# Patient Record
Sex: FEMALE | Race: WHITE | NOT HISPANIC OR LATINO | Employment: OTHER | URBAN - METROPOLITAN AREA
[De-identification: names, ages, dates, MRNs, and addresses within clinical notes are randomized per-mention and may not be internally consistent; named-entity substitution may affect disease eponyms.]

---

## 2018-03-19 ENCOUNTER — ESTABLISHED COMPREHENSIVE EXAM (OUTPATIENT)
Dept: URBAN - METROPOLITAN AREA CLINIC 40 | Facility: CLINIC | Age: 63
End: 2018-03-19

## 2018-03-19 DIAGNOSIS — Z83.518: ICD-10-CM

## 2018-03-19 DIAGNOSIS — H52.4: ICD-10-CM

## 2018-03-19 DIAGNOSIS — H52.03: ICD-10-CM

## 2018-03-19 DIAGNOSIS — Z79.899: ICD-10-CM

## 2018-03-19 DIAGNOSIS — H52.203: ICD-10-CM

## 2018-03-19 PROCEDURE — 92014 COMPRE OPH EXAM EST PT 1/>: CPT

## 2018-03-19 PROCEDURE — 92015 DETERMINE REFRACTIVE STATE: CPT

## 2018-03-19 PROCEDURE — 92083 EXTENDED VISUAL FIELD XM: CPT

## 2018-03-19 PROCEDURE — 92499OP2 OPTOMAP RETINAL SCREENING BOTH EYES

## 2018-03-19 ASSESSMENT — VISUAL ACUITY
OD_CC: J1
OD_SC: 20/25-1
OU_SC: 20/20
OS_CC: J1
OS_SC: 20/25-1
OU_CC: J1

## 2018-03-19 ASSESSMENT — KERATOMETRY
OS_K1POWER_DIOPTERS: 46.25
OD_AXISANGLE2_DEGREES: 078
OS_AXISANGLE2_DEGREES: 056
OD_K1POWER_DIOPTERS: 46.25
OD_AXISANGLE_DEGREES: 168
OS_K2POWER_DIOPTERS: 46.00
OS_AXISANGLE_DEGREES: 146
OD_K2POWER_DIOPTERS: 45.25

## 2018-03-19 ASSESSMENT — TONOMETRY
OS_IOP_MMHG: 15
OD_IOP_MMHG: 16

## 2018-06-14 NOTE — PATIENT DISCUSSION
This visual field clearly demonstrated a minimum of % loss of upper field of vision , with upper lid skin in repose and elevated by taping of the lid to demonstrate potential correction. This field shows that taping the lids significantly improved this patient's superior field of vision by approximately %, .

## 2018-06-14 NOTE — PATIENT DISCUSSION
Recommended excision/biopsy. Will schedule orbitotomy with lesion removal in [facility] [place].  Recommended doing LEFT eye first.

## 2018-08-02 NOTE — PATIENT DISCUSSION
Recommended excision/biopsy. Will schedule orbitotomy with lesion removal in [facility].  Recommended doing LEFT eye first.

## 2018-08-02 NOTE — PATIENT DISCUSSION
return in 1 month for ectopian repair LLL, do this sx same time OD Lesion removal ex bx (lipoma OD removal).

## 2020-06-22 ENCOUNTER — EST. PATIENT EMERGENCY (OUTPATIENT)
Dept: URBAN - METROPOLITAN AREA CLINIC 40 | Facility: CLINIC | Age: 65
End: 2020-06-22

## 2020-06-22 DIAGNOSIS — G43.B0: ICD-10-CM

## 2020-06-22 DIAGNOSIS — Z79.899: ICD-10-CM

## 2020-06-22 DIAGNOSIS — H25.812: ICD-10-CM

## 2020-06-22 DIAGNOSIS — H25.811: ICD-10-CM

## 2020-06-22 PROCEDURE — 92014 COMPRE OPH EXAM EST PT 1/>: CPT

## 2020-06-22 ASSESSMENT — KERATOMETRY
OD_K2POWER_DIOPTERS: 45.25
OD_K1POWER_DIOPTERS: 46.25
OS_AXISANGLE_DEGREES: 146
OD_AXISANGLE2_DEGREES: 078
OS_K1POWER_DIOPTERS: 46.25
OD_AXISANGLE_DEGREES: 168
OS_K2POWER_DIOPTERS: 46.00
OS_AXISANGLE2_DEGREES: 056

## 2020-06-22 ASSESSMENT — VISUAL ACUITY
OS_SC: 20/30-2
OD_SC: 20/25-1

## 2020-06-22 ASSESSMENT — TONOMETRY
OS_IOP_MMHG: 16
OD_IOP_MMHG: 17

## 2021-05-13 ENCOUNTER — ESTABLISHED COMPREHENSIVE EXAM (OUTPATIENT)
Dept: URBAN - METROPOLITAN AREA CLINIC 40 | Facility: CLINIC | Age: 66
End: 2021-05-13

## 2021-05-13 DIAGNOSIS — H25.813: ICD-10-CM

## 2021-05-13 DIAGNOSIS — H52.203: ICD-10-CM

## 2021-05-13 DIAGNOSIS — Z79.899: ICD-10-CM

## 2021-05-13 DIAGNOSIS — H52.4: ICD-10-CM

## 2021-05-13 DIAGNOSIS — H52.03: ICD-10-CM

## 2021-05-13 DIAGNOSIS — G43.B0: ICD-10-CM

## 2021-05-13 PROCEDURE — 92015 DETERMINE REFRACTIVE STATE: CPT

## 2021-05-13 PROCEDURE — 92012 INTRM OPH EXAM EST PATIENT: CPT

## 2021-05-13 PROCEDURE — 92134 CPTRZ OPH DX IMG PST SGM RTA: CPT

## 2021-05-13 RX ORDER — PREDNISOLONE ACETATE 10 MG/ML: 1 SUSPENSION/ DROPS OPHTHALMIC

## 2021-05-13 ASSESSMENT — KERATOMETRY
OD_K1POWER_DIOPTERS: 46.25
OS_K2POWER_DIOPTERS: 46.00
OD_AXISANGLE2_DEGREES: 078
OS_K1POWER_DIOPTERS: 46.25
OS_AXISANGLE_DEGREES: 146
OD_AXISANGLE_DEGREES: 168
OD_K2POWER_DIOPTERS: 45.25
OS_AXISANGLE2_DEGREES: 056

## 2021-05-13 ASSESSMENT — VISUAL ACUITY
OD_CC: J1
OS_SC: 20/20-1
OS_SC: J8
OS_CC: J1
OD_SC: J8
OD_SC: 20/20-2

## 2021-05-13 ASSESSMENT — TONOMETRY
OS_IOP_MMHG: 18
OD_IOP_MMHG: 19

## 2021-06-09 ENCOUNTER — ESTABLISHED PATIENT (OUTPATIENT)
Dept: URBAN - METROPOLITAN AREA CLINIC 40 | Facility: CLINIC | Age: 66
End: 2021-06-09

## 2021-06-09 DIAGNOSIS — H15.101: ICD-10-CM

## 2021-06-09 PROCEDURE — 92012 INTRM OPH EXAM EST PATIENT: CPT

## 2021-06-09 ASSESSMENT — KERATOMETRY
OS_K2POWER_DIOPTERS: 46.00
OD_K1POWER_DIOPTERS: 46.25
OS_AXISANGLE_DEGREES: 146
OD_K2POWER_DIOPTERS: 45.25
OD_AXISANGLE2_DEGREES: 078
OS_AXISANGLE2_DEGREES: 056
OS_K1POWER_DIOPTERS: 46.25
OD_AXISANGLE_DEGREES: 168

## 2021-09-01 ENCOUNTER — CONSULT (OUTPATIENT)
Dept: URBAN - METROPOLITAN AREA CLINIC 39 | Facility: CLINIC | Age: 66
End: 2021-09-01

## 2021-09-01 DIAGNOSIS — H02.835: ICD-10-CM

## 2021-09-01 DIAGNOSIS — H02.832: ICD-10-CM

## 2021-09-01 DIAGNOSIS — Z79.899: ICD-10-CM

## 2021-09-01 DIAGNOSIS — H02.834: ICD-10-CM

## 2021-09-01 DIAGNOSIS — H02.831: ICD-10-CM

## 2021-09-01 PROCEDURE — 99213 OFFICE O/P EST LOW 20 MIN: CPT

## 2021-09-01 PROCEDURE — 92285 EXTERNAL OCULAR PHOTOGRAPHY: CPT

## 2021-09-01 ASSESSMENT — KERATOMETRY
OS_K1POWER_DIOPTERS: 46.25
OD_AXISANGLE2_DEGREES: 078
OD_K1POWER_DIOPTERS: 46.25
OD_K2POWER_DIOPTERS: 45.25
OS_AXISANGLE2_DEGREES: 056
OD_AXISANGLE_DEGREES: 168
OS_K2POWER_DIOPTERS: 46.00
OS_AXISANGLE_DEGREES: 146

## 2021-09-01 ASSESSMENT — VISUAL ACUITY
OS_CC: 20/25+1
OD_CC: 20/25

## 2021-09-22 ENCOUNTER — TECH ONLY (OUTPATIENT)
Dept: URBAN - METROPOLITAN AREA CLINIC 39 | Facility: CLINIC | Age: 66
End: 2021-09-22

## 2021-09-22 DIAGNOSIS — Z79.899: ICD-10-CM

## 2021-09-22 DIAGNOSIS — H02.835: ICD-10-CM

## 2021-09-22 DIAGNOSIS — H02.831: ICD-10-CM

## 2021-09-22 DIAGNOSIS — H02.834: ICD-10-CM

## 2021-09-22 DIAGNOSIS — G43.B0: ICD-10-CM

## 2021-09-22 DIAGNOSIS — H02.832: ICD-10-CM

## 2021-09-22 PROCEDURE — 92082 INTERMEDIATE VISUAL FIELD XM: CPT

## 2021-09-22 PROCEDURE — 99211T TECH SERVICE

## 2021-09-22 ASSESSMENT — KERATOMETRY
OD_AXISANGLE2_DEGREES: 078
OD_AXISANGLE_DEGREES: 168
OS_AXISANGLE2_DEGREES: 056
OD_K1POWER_DIOPTERS: 46.25
OS_AXISANGLE_DEGREES: 146
OD_K2POWER_DIOPTERS: 45.25
OS_K2POWER_DIOPTERS: 46.00
OS_K1POWER_DIOPTERS: 46.25

## 2021-10-08 ENCOUNTER — EST. PATIENT EMERGENCY (OUTPATIENT)
Dept: URBAN - METROPOLITAN AREA CLINIC 40 | Facility: CLINIC | Age: 66
End: 2021-10-08

## 2021-10-08 DIAGNOSIS — H57.12: ICD-10-CM

## 2021-10-08 DIAGNOSIS — G43.B0: ICD-10-CM

## 2021-10-08 PROCEDURE — 92012 INTRM OPH EXAM EST PATIENT: CPT

## 2021-10-08 ASSESSMENT — VISUAL ACUITY
OD_CC: 20/20
OU_CC: 20/20
OD_CC: J1+
OS_CC: 20/20
OU_CC: J1+
OS_CC: J1+

## 2021-10-08 ASSESSMENT — KERATOMETRY
OD_K2POWER_DIOPTERS: 45.25
OD_K1POWER_DIOPTERS: 46.25
OD_AXISANGLE2_DEGREES: 078
OS_K2POWER_DIOPTERS: 46.00
OS_K1POWER_DIOPTERS: 46.25
OD_AXISANGLE_DEGREES: 168
OS_AXISANGLE2_DEGREES: 056
OS_AXISANGLE_DEGREES: 146

## 2021-10-08 ASSESSMENT — TONOMETRY
OD_IOP_MMHG: 15
OS_IOP_MMHG: 15

## 2021-11-10 ENCOUNTER — PRE-OP/H&P (OUTPATIENT)
Dept: URBAN - METROPOLITAN AREA CLINIC 39 | Facility: CLINIC | Age: 66
End: 2021-11-10

## 2021-11-10 DIAGNOSIS — G43.B0: ICD-10-CM

## 2021-11-10 DIAGNOSIS — Z79.899: ICD-10-CM

## 2021-11-10 DIAGNOSIS — H02.831: ICD-10-CM

## 2021-11-10 DIAGNOSIS — H02.834: ICD-10-CM

## 2021-11-10 DIAGNOSIS — H15.101: ICD-10-CM

## 2021-11-10 DIAGNOSIS — H25.813: ICD-10-CM

## 2021-11-10 DIAGNOSIS — H02.835: ICD-10-CM

## 2021-11-10 DIAGNOSIS — H02.832: ICD-10-CM

## 2021-11-10 PROCEDURE — 99211HP H&P OFFICE/OUTPATIENT VISIT, EST

## 2021-11-10 RX ORDER — ERYTHROMYCIN 5 MG/G
OINTMENT OPHTHALMIC
Start: 2021-12-06

## 2021-11-10 RX ORDER — TRAMADOL HCL 50 MG/1
1 TABLET ORAL
Start: 2021-12-06

## 2021-11-10 ASSESSMENT — KERATOMETRY
OS_K1POWER_DIOPTERS: 46.25
OD_K1POWER_DIOPTERS: 46.25
OD_AXISANGLE2_DEGREES: 078
OD_K2POWER_DIOPTERS: 45.25
OS_AXISANGLE2_DEGREES: 056
OD_AXISANGLE_DEGREES: 168
OS_AXISANGLE_DEGREES: 146
OS_K2POWER_DIOPTERS: 46.00

## 2021-12-06 ENCOUNTER — SURGERY/PROCEDURE (OUTPATIENT)
Dept: URBAN - METROPOLITAN AREA SURGERY 14 | Facility: SURGERY | Age: 66
End: 2021-12-06

## 2021-12-06 ENCOUNTER — PRE-OP/H&P (OUTPATIENT)
Dept: URBAN - METROPOLITAN AREA SURGERY 14 | Facility: SURGERY | Age: 66
End: 2021-12-06

## 2021-12-06 DIAGNOSIS — H02.831: ICD-10-CM

## 2021-12-06 DIAGNOSIS — H02.834: ICD-10-CM

## 2021-12-06 PROCEDURE — 1582350 UPPER BLEPH PER EYE FUNCTIONAL-BILATERAL

## 2021-12-06 PROCEDURE — 99211T TECH SERVICE

## 2021-12-06 ASSESSMENT — KERATOMETRY
OD_AXISANGLE_DEGREES: 168
OD_K1POWER_DIOPTERS: 46.25
OS_AXISANGLE2_DEGREES: 056
OS_AXISANGLE_DEGREES: 146
OS_K1POWER_DIOPTERS: 46.25
OS_K2POWER_DIOPTERS: 46.00
OD_AXISANGLE2_DEGREES: 078
OD_K2POWER_DIOPTERS: 45.25

## 2021-12-09 ASSESSMENT — KERATOMETRY
OD_K2POWER_DIOPTERS: 45.25
OS_K2POWER_DIOPTERS: 46.00
OD_AXISANGLE_DEGREES: 168
OD_AXISANGLE2_DEGREES: 078
OS_AXISANGLE2_DEGREES: 056
OD_K1POWER_DIOPTERS: 46.25
OS_K1POWER_DIOPTERS: 46.25
OS_AXISANGLE_DEGREES: 146

## 2021-12-10 ASSESSMENT — KERATOMETRY
OD_AXISANGLE_DEGREES: 168
OD_K1POWER_DIOPTERS: 46.25
OS_AXISANGLE2_DEGREES: 056
OS_K1POWER_DIOPTERS: 46.25
OS_AXISANGLE_DEGREES: 146
OD_AXISANGLE2_DEGREES: 078
OS_K2POWER_DIOPTERS: 46.00
OD_K2POWER_DIOPTERS: 45.25

## 2021-12-13 ENCOUNTER — POST-OP (OUTPATIENT)
Dept: URBAN - METROPOLITAN AREA CLINIC 39 | Facility: CLINIC | Age: 66
End: 2021-12-13

## 2021-12-13 DIAGNOSIS — Z79.899: ICD-10-CM

## 2021-12-13 DIAGNOSIS — H02.831: ICD-10-CM

## 2021-12-13 DIAGNOSIS — H02.832: ICD-10-CM

## 2021-12-13 DIAGNOSIS — H15.101: ICD-10-CM

## 2021-12-13 DIAGNOSIS — H25.813: ICD-10-CM

## 2021-12-13 DIAGNOSIS — H02.834: ICD-10-CM

## 2021-12-13 DIAGNOSIS — Z98.890: ICD-10-CM

## 2021-12-13 DIAGNOSIS — G43.B0: ICD-10-CM

## 2021-12-13 DIAGNOSIS — H02.835: ICD-10-CM

## 2021-12-13 PROCEDURE — 99024 POSTOP FOLLOW-UP VISIT: CPT

## 2021-12-13 RX ORDER — CEPHALEXIN 500 MG/1: 1 CAPSULE ORAL TWICE A DAY

## 2021-12-13 RX ORDER — CEPHALEXIN 500 MG/1
1 CAPSULE ORAL TWICE A DAY
End: 2021-12-29

## 2021-12-13 ASSESSMENT — KERATOMETRY
OD_K2POWER_DIOPTERS: 45.25
OD_K1POWER_DIOPTERS: 46.25
OS_K2POWER_DIOPTERS: 46.00
OS_K1POWER_DIOPTERS: 46.25
OD_AXISANGLE_DEGREES: 168
OS_AXISANGLE2_DEGREES: 056
OD_AXISANGLE2_DEGREES: 078
OS_AXISANGLE_DEGREES: 146

## 2021-12-14 ASSESSMENT — VISUAL ACUITY
OS_CC: 20/25
OD_CC: 20/20

## 2021-12-29 ENCOUNTER — POST-OP (OUTPATIENT)
Dept: URBAN - METROPOLITAN AREA CLINIC 39 | Facility: CLINIC | Age: 66
End: 2021-12-29

## 2021-12-29 DIAGNOSIS — H02.832: ICD-10-CM

## 2021-12-29 DIAGNOSIS — Z79.899: ICD-10-CM

## 2021-12-29 DIAGNOSIS — H15.101: ICD-10-CM

## 2021-12-29 DIAGNOSIS — H02.835: ICD-10-CM

## 2021-12-29 DIAGNOSIS — H02.834: ICD-10-CM

## 2021-12-29 DIAGNOSIS — H02.831: ICD-10-CM

## 2021-12-29 DIAGNOSIS — G43.B0: ICD-10-CM

## 2021-12-29 DIAGNOSIS — H25.813: ICD-10-CM

## 2021-12-29 PROCEDURE — 99024 POSTOP FOLLOW-UP VISIT: CPT

## 2021-12-29 ASSESSMENT — KERATOMETRY
OS_AXISANGLE2_DEGREES: 056
OD_K1POWER_DIOPTERS: 46.25
OD_AXISANGLE2_DEGREES: 078
OD_K2POWER_DIOPTERS: 45.25
OS_K2POWER_DIOPTERS: 46.00
OS_K1POWER_DIOPTERS: 46.25
OS_AXISANGLE_DEGREES: 146
OD_AXISANGLE_DEGREES: 168

## 2022-06-16 ENCOUNTER — COMPREHENSIVE EXAM (OUTPATIENT)
Dept: URBAN - METROPOLITAN AREA CLINIC 40 | Facility: CLINIC | Age: 67
End: 2022-06-16

## 2022-06-16 DIAGNOSIS — G43.B0: ICD-10-CM

## 2022-06-16 DIAGNOSIS — M06.9: ICD-10-CM

## 2022-06-16 DIAGNOSIS — H02.832: ICD-10-CM

## 2022-06-16 DIAGNOSIS — H02.831: ICD-10-CM

## 2022-06-16 DIAGNOSIS — H02.835: ICD-10-CM

## 2022-06-16 DIAGNOSIS — H25.813: ICD-10-CM

## 2022-06-16 DIAGNOSIS — Z83.518: ICD-10-CM

## 2022-06-16 DIAGNOSIS — Z79.899: ICD-10-CM

## 2022-06-16 DIAGNOSIS — H02.834: ICD-10-CM

## 2022-06-16 DIAGNOSIS — H52.203: ICD-10-CM

## 2022-06-16 PROCEDURE — 92015 DETERMINE REFRACTIVE STATE: CPT

## 2022-06-16 PROCEDURE — 92014 COMPRE OPH EXAM EST PT 1/>: CPT

## 2022-06-16 ASSESSMENT — VISUAL ACUITY
OS_CC: 20/20
OD_SC: 20/50
OD_CC: 20/20
OS_SC: 20/20-1
OD_CC: J1
OD_SC: J12
OS_CC: J1
OS_SC: J12

## 2022-06-16 ASSESSMENT — TONOMETRY
OS_IOP_MMHG: 19
OD_IOP_MMHG: 17